# Patient Record
(demographics unavailable — no encounter records)

---

## 2024-10-08 NOTE — PHYSICAL EXAM
[Chaperone Present] : A chaperone was present in the examining room during all aspects of the physical examination [Normal] : No focal neurologic defects observed [Restricted in physically strenuous activity but ambulatory and able to carry out work of a light or sedentary nature] : Status 1- Restricted in physically strenuous activity but ambulatory and able to carry out work of a light or sedentary nature, e.g., light house work, office work [FreeTextEntry1] : wearing hajab.  has alopecia [de-identified] : incision c/d/i, SOft, NT

## 2024-10-08 NOTE — HISTORY OF PRESENT ILLNESS
[FreeTextEntry1] : Problem: 1) Primary Peritoneal Carcinoma  2) Germline testing negative, tumor HRD+   Previous Therapies: 1) Pelvic MRI 24     a) Uterus: 5.3 x 2.7 x 4.3 cm EMS 0.2cm     b) RIGHT OVARY: Normal in size and appearance measuring measures 2.4 x 1.3 x 1.8 cm.      c) LEFT OVARY: A normal-appearing left ovary is not visualized. Peritoneal adnexal masses as described below.     d) PERITONEUM: There are heterogeneous T2 hyperintense/T1 mildly hypointense pelvic masses at the left adnexa as well as anterior to the uterus and rectum. These measure as follows:          1) Mass extending anteriorly from the level of the uterine fundus just above the urinary bladder measuring approximately 6.4 x 4.8 x 6.5 cm. There is significant mural enhancing nodularity, particularly along the right side of the mass measuring up to 3.8 x 5.1 x 5.4 cm.          2)  second mass at the cul-de-sac posterior to the uterus measuring approximately 3.3 x 3.9 x 2.8 cm with small right paracentral area which is nonenhancing. Features are very similar to the first mass. The rectum partly wraps around this mass.         3) a left pelvic sidewall/adnexa similar route bilobed enhancing mass measuring approximately 4.8 x 2.2 x 5 cm.         4) left the lower pelvic sidewall nodule just medial to the sigmoid colon measuring approximately 2.7 x 2.1 x 1.8 cm.         5) anterior to the main mass is a small peritoneal implant just deep to the anterior abdominal wall measuring approximately 1.4 x 1.3 x 1.4 cm.      e) Moderate pelvic free fluid is visualized.     f)  a rectosigmoid wall thickening along the margins of the mass 2) CT A/P 24     a) Exophytic soft tissue lesion extending from the uterine fundus, likely a subserosal leiomyoma.     b) Left adnexal mass; likely originating from the left ovary.     c) Indeterminate 1.6 m soft tissue nodule adjacent to the cecum. As an isolated finding is nonspecific, however, cannot exclude a metastasis if the left ovarian mass is a malignant neoplasm.     d) Left hydronephrosis and hydroureter to the level of the patient's midpelvis, likely due to involvement by the above-described left adnexal mass. 3) S/P ex-lap JOSE F, BSO, omentectomy, removal of pelvic mass, flex sig by Dr. Arevalo, cysto with b/l temporary stents by urology 24     a) PELVIC WASH: Malignant Cells    b) . 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 Uterus, cervix, bilateral fallopian tubes and ovaries: Corpus: - Basal benign endometrium with cystic atrophy Cervix: - Tunnel clusters and Nabothian cysts Ovary, right: - No pathological diagnosis Fallopian tube, right: - No pathological diagnosis Ovary, left: - No pathological diagnosis Fallopian tube, left: -  Focal chronic inflammation involving the fimbriated end  3. 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 BRCA negative.   ObGYNHX: denies,  x 5 PMHX: Cardiac issues  - 2 stents placed , depression/anxiety  SX: cataract, cardiac stents  MED: Aspirin 81mg, atorvastatin, metoprolol, womens multivitamin ALL: NKDA SOCIAL: Never smoker  FAM: NC  Last Mammogram: up to date  Last pap/HPV: 23 neg Last Colonoscopy: never

## 2024-10-08 NOTE — PHYSICAL EXAM
[Chaperone Present] : A chaperone was present in the examining room during all aspects of the physical examination [Normal] : No focal neurologic defects observed [Restricted in physically strenuous activity but ambulatory and able to carry out work of a light or sedentary nature] : Status 1- Restricted in physically strenuous activity but ambulatory and able to carry out work of a light or sedentary nature, e.g., light house work, office work [FreeTextEntry1] : wearing hajab.  has alopecia [de-identified] : incision c/d/i, SOft, NT

## 2024-10-08 NOTE — DISCUSSION/SUMMARY
[Reviewed Clinical Lab Test(s)] : Results of clinical tests were reviewed. [FreeTextEntry1] : S/P ex-lap JOSE F, BSO, omentectomy, removal of pelvic mass, flex sig by Dr. Arevalo, cysto with b/l temporary stents by urology.  I discussed with the patient, reviewed the findings on history and physical examination, and reviewed the imaging studies in detail.   She will need NACT and then imaging after 3 cycles. Then she will have sigmoidoscopy with Pete Arevalo and eval for possible LAR +/- ostomy [] pre chemo labs done. [] chemo calendar given [] C2 Taxol/Carbo 10/11 []repeat imaging after C3, follow up prior to C3

## 2024-10-08 NOTE — PHYSICAL EXAM
[Chaperone Present] : A chaperone was present in the examining room during all aspects of the physical examination [Normal] : No focal neurologic defects observed [Restricted in physically strenuous activity but ambulatory and able to carry out work of a light or sedentary nature] : Status 1- Restricted in physically strenuous activity but ambulatory and able to carry out work of a light or sedentary nature, e.g., light house work, office work [FreeTextEntry1] : wearing hajab.  has alopecia [de-identified] : incision c/d/i, SOft, NT

## 2024-10-28 NOTE — HISTORY OF PRESENT ILLNESS
[FreeTextEntry1] : Problem: 1) Primary Peritoneal Carcinoma  2) Germline testing negative, tumor HRD+   Previous Therapies: 1) Pelvic MRI 24     a) Uterus: 5.3 x 2.7 x 4.3 cm EMS 0.2cm     b) RIGHT OVARY: Normal in size and appearance measuring measures 2.4 x 1.3 x 1.8 cm.      c) LEFT OVARY: A normal-appearing left ovary is not visualized. Peritoneal adnexal masses as described below.     d) PERITONEUM: There are heterogeneous T2 hyperintense/T1 mildly hypointense pelvic masses at the left adnexa as well as anterior to the uterus and rectum. These measure as follows:          1) Mass extending anteriorly from the level of the uterine fundus just above the urinary bladder measuring approximately 6.4 x 4.8 x 6.5 cm. There is significant mural enhancing nodularity, particularly along the right side of the mass measuring up to 3.8 x 5.1 x 5.4 cm.          2)  second mass at the cul-de-sac posterior to the uterus measuring approximately 3.3 x 3.9 x 2.8 cm with small right paracentral area which is nonenhancing. Features are very similar to the first mass. The rectum partly wraps around this mass.         3) a left pelvic sidewall/adnexa similar route bilobed enhancing mass measuring approximately 4.8 x 2.2 x 5 cm.         4) left the lower pelvic sidewall nodule just medial to the sigmoid colon measuring approximately 2.7 x 2.1 x 1.8 cm.         5) anterior to the main mass is a small peritoneal implant just deep to the anterior abdominal wall measuring approximately 1.4 x 1.3 x 1.4 cm.      e) Moderate pelvic free fluid is visualized.     f)  a rectosigmoid wall thickening along the margins of the mass 2) CT A/P 24     a) Exophytic soft tissue lesion extending from the uterine fundus, likely a subserosal leiomyoma.     b) Left adnexal mass; likely originating from the left ovary.     c) Indeterminate 1.6 m soft tissue nodule adjacent to the cecum. As an isolated finding is nonspecific, however, cannot exclude a metastasis if the left ovarian mass is a malignant neoplasm.     d) Left hydronephrosis and hydroureter to the level of the patient's midpelvis, likely due to involvement by the above-described left adnexal mass. 3) S/P ex-lap JOSE F, BSO, omentectomy, removal of pelvic mass, flex sig by Dr. Arevalo, cysto with b/l temporary stents by urology 24     a) PELVIC WASH: Malignant Cells    b) . 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 Uterus, cervix, bilateral fallopian tubes and ovaries: Corpus: - Basal benign endometrium with cystic atrophy Cervix: - Tunnel clusters and Nabothian cysts Ovary, right: - No pathological diagnosis Fallopian tube, right: - No pathological diagnosis Ovary, left: - No pathological diagnosis Fallopian tube, left: -  Focal chronic inflammation involving the fimbriated end  3. 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 Feeling well. Having persistent tingling of all fingers , bothersome but not painful. Toes have intermittent tingling. Bowel/bladder habits at baseline. Has come abdominopelvic cramping, comes and goes.   ObGYNHX: denies,  x 5 PMHX: Cardiac issues  - 2 stents placed , depression/anxiety  SX: cataract, cardiac stents  MED: Aspirin 81mg, atorvastatin, metoprolol, womens multivitamin ALL: NKDA SOCIAL: Never smoker  FAM: NC  Last Mammogram: up to date  Last pap/HPV: 23 neg Last Colonoscopy: never

## 2024-10-28 NOTE — DISCUSSION/SUMMARY
[FreeTextEntry1] : S/P ex-lap JOSE F, BSO, omentectomy, removal of pelvic mass, flex sig by Dr. Arevalo, cysto with b/l temporary stents by urology.  I discussed with the patient, reviewed the findings on history and physical examination, and reviewed the imaging studies in detail.   She will need NACT and then imaging after 3 cycles. Then she will have sigmoidoscopy with Dr. Arevalo and eval for possible LAR +/- ostomy [] pre chemo labs done. [] chemo calendar given [] C3 Taxol/Carbo 11/1 []repeat CTAP after this cycle, then follow up with Dr. Andrea to discuss results and possible LAR with Dr. Arevalo

## 2024-11-12 NOTE — DISCUSSION/SUMMARY
[Reviewed Clinical Lab Test(s)] : Results of clinical tests were reviewed. [FreeTextEntry1] : S/P ex-lap JOSE F, BSO, omentectomy, removal of pelvic mass, flex sig by Dr. Arevalo, cysto with b/l temporary stents by urology.  I discussed with the patient, reviewed the findings on history and physical examination, and reviewed the imaging studies in detail.   She will need NACT and then imaging after 3 cycles. Then she will have sigmoidoscopy with Dr. Arevalo and brandi for possible LAR +/- ostomy

## 2024-11-12 NOTE — PHYSICAL EXAM
[Chaperone Present] : A chaperone was present in the examining room during all aspects of the physical examination [Normal] : No focal neurologic defects observed [FreeTextEntry1] : wearing hajab.  has alopecia [de-identified] : incision c/d/i, SOft, NT [Restricted in physically strenuous activity but ambulatory and able to carry out work of a light or sedentary nature] : Status 1- Restricted in physically strenuous activity but ambulatory and able to carry out work of a light or sedentary nature, e.g., light house work, office work

## 2024-11-12 NOTE — HISTORY OF PRESENT ILLNESS
[FreeTextEntry1] : Problem: 1) Primary Peritoneal Carcinoma  2) Germline testing negative, tumor HRD+   Previous Therapies: 1) Pelvic MRI 24     a) Uterus: 5.3 x 2.7 x 4.3 cm EMS 0.2cm     b) RIGHT OVARY: Normal in size and appearance measuring measures 2.4 x 1.3 x 1.8 cm.      c) LEFT OVARY: A normal-appearing left ovary is not visualized. Peritoneal adnexal masses as described below.     d) PERITONEUM: There are heterogeneous T2 hyperintense/T1 mildly hypointense pelvic masses at the left adnexa as well as anterior to the uterus and rectum. These measure as follows:          1) Mass extending anteriorly from the level of the uterine fundus just above the urinary bladder measuring approximately 6.4 x 4.8 x 6.5 cm. There is significant mural enhancing nodularity, particularly along the right side of the mass measuring up to 3.8 x 5.1 x 5.4 cm.          2)  second mass at the cul-de-sac posterior to the uterus measuring approximately 3.3 x 3.9 x 2.8 cm with small right paracentral area which is nonenhancing. Features are very similar to the first mass. The rectum partly wraps around this mass.         3) a left pelvic sidewall/adnexa similar route bilobed enhancing mass measuring approximately 4.8 x 2.2 x 5 cm.         4) left the lower pelvic sidewall nodule just medial to the sigmoid colon measuring approximately 2.7 x 2.1 x 1.8 cm.         5) anterior to the main mass is a small peritoneal implant just deep to the anterior abdominal wall measuring approximately 1.4 x 1.3 x 1.4 cm.      e) Moderate pelvic free fluid is visualized.     f)  a rectosigmoid wall thickening along the margins of the mass 2) CT A/P 24     a) Exophytic soft tissue lesion extending from the uterine fundus, likely a subserosal leiomyoma.     b) Left adnexal mass; likely originating from the left ovary.     c) Indeterminate 1.6 m soft tissue nodule adjacent to the cecum. As an isolated finding is nonspecific, however, cannot exclude a metastasis if the left ovarian mass is a malignant neoplasm.     d) Left hydronephrosis and hydroureter to the level of the patient's midpelvis, likely due to involvement by the above-described left adnexal mass. 3) S/P ex-lap JOSE F, BSO, omentectomy, removal of pelvic mass, flex sig by Dr. Arevalo, cysto with b/l temporary stents by urology 24     a) PELVIC WASH: Malignant Cells    b) . 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 Uterus, cervix, bilateral fallopian tubes and ovaries: Corpus: - Basal benign endometrium with cystic atrophy Cervix: - Tunnel clusters and Nabothian cysts Ovary, right: - No pathological diagnosis Fallopian tube, right: - No pathological diagnosis Ovary, left: - No pathological diagnosis Fallopian tube, left: -  Focal chronic inflammation involving the fimbriated end  3. 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 Patient is s/p C3 T/C 24. For possible interval debulking of residual rectal disease with Dr. Arevalo.   ObGYNHX: denies,  x 5 PMHX: Cardiac issues  - 2 stents placed , depression/anxiety  SX: cataract, cardiac stents  MED: Aspirin 81mg, atorvastatin, metoprolol, womens multivitamin ALL: NKDA SOCIAL: Never smoker  FAM: NC  Last Mammogram: up to date  Last pap/HPV: 23 neg Last Colonoscopy: never

## 2024-11-23 NOTE — PHYSICAL EXAM
[Normal] : Recto-Vaginal Exam: Normal [FreeTextEntry1] : wearing hajab.  has alopecia [de-identified] : incision c/d/i, SOft, NT

## 2024-11-23 NOTE — PHYSICAL EXAM
[Normal] : Recto-Vaginal Exam: Normal [FreeTextEntry1] : wearing hajab.  has alopecia [de-identified] : incision c/d/i, SOft, NT

## 2024-11-23 NOTE — DISCUSSION/SUMMARY
[Reviewed Radiology Report(s)] : Radiology reports were reviewed. [Reviewed Radiology Film/Image(s)] : Images from radiology studies were reviewed and examined. [Discuss Tests w/Referring Providers] : Results of labs/radiology studies and the treatment recommendations were discussed with performing/referring physician. [Discuss Alternatives/Risks/Benefits w/Patient] : All alternatives, risks, and benefits were discussed with the patient/family and all questions were answered.  Patient expressed good understanding and appreciates the importance of follow up as recommended. [FreeTextEntry1] : S/P ex-lap JOSE F, BSO, omentectomy, removal of pelvic mass, flex sig by Dr. Arevalo, cysto with b/l temporary stents by urology.  I discussed with the patient, reviewed the findings on history and physical examination, and reviewed the imaging studies in detail.    She will have sigmoidoscopy with Dr. Arevalo and eval for possible LAR +/- ostomy  [] Appointment with Dr. Arevalo 11/25/24  [] Plan for advanced laparoscopic robot assisted LAR and HIPEC, possible open procedure, possible ostomy formation 12/03/24 [] Medical clearance with PCP and caridologist - patient is taking aspirin for cardiac stents [] Bowel prep [] Pre-op labs today [] Patient will likely require dose reduction when she resumes adjuvant chemotherapy due to neuropathy

## 2024-11-23 NOTE — PHYSICAL EXAM
[Normal] : Recto-Vaginal Exam: Normal [FreeTextEntry1] : wearing hajab.  has alopecia [de-identified] : incision c/d/i, SOft, NT

## 2024-11-23 NOTE — HISTORY OF PRESENT ILLNESS
[FreeTextEntry1] : Problem: 1) Primary Peritoneal Carcinoma  2) Germline testing negative, tumor HRD+   Previous Therapies: 1) Pelvic MRI 24     a) Uterus: 5.3 x 2.7 x 4.3 cm EMS 0.2cm     b) RIGHT OVARY: Normal in size and appearance measuring measures 2.4 x 1.3 x 1.8 cm.      c) LEFT OVARY: A normal-appearing left ovary is not visualized. Peritoneal adnexal masses as described below.     d) PERITONEUM: There are heterogeneous T2 hyperintense/T1 mildly hypointense pelvic masses at the left adnexa as well as anterior to the uterus and rectum. These measure as follows:          1) Mass extending anteriorly from the level of the uterine fundus just above the urinary bladder measuring approximately 6.4 x 4.8 x 6.5 cm. There is significant mural enhancing nodularity, particularly along the right side of the mass measuring up to 3.8 x 5.1 x 5.4 cm.          2)  second mass at the cul-de-sac posterior to the uterus measuring approximately 3.3 x 3.9 x 2.8 cm with small right paracentral area which is nonenhancing. Features are very similar to the first mass. The rectum partly wraps around this mass.         3) a left pelvic sidewall/adnexa similar route bilobed enhancing mass measuring approximately 4.8 x 2.2 x 5 cm.         4) left the lower pelvic sidewall nodule just medial to the sigmoid colon measuring approximately 2.7 x 2.1 x 1.8 cm.         5) anterior to the main mass is a small peritoneal implant just deep to the anterior abdominal wall measuring approximately 1.4 x 1.3 x 1.4 cm.      e) Moderate pelvic free fluid is visualized.     f)  a rectosigmoid wall thickening along the margins of the mass 2) CT A/P 24     a) Exophytic soft tissue lesion extending from the uterine fundus, likely a subserosal leiomyoma.     b) Left adnexal mass; likely originating from the left ovary.     c) Indeterminate 1.6 m soft tissue nodule adjacent to the cecum. As an isolated finding is nonspecific, however, cannot exclude a metastasis if the left ovarian mass is a malignant neoplasm.     d) Left hydronephrosis and hydroureter to the level of the patient's midpelvis, likely due to involvement by the above-described left adnexal mass. 3) S/P ex-lap JOSE F, BSO, omentectomy, removal of pelvic mass, flex sig by Dr. Arevalo, cysto with b/l temporary stents by urology 24     a) PELVIC WASH: Malignant Cells    b) . 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 Uterus, cervix, bilateral fallopian tubes and ovaries: Corpus: - Basal benign endometrium with cystic atrophy Cervix: - Tunnel clusters and Nabothian cysts Ovary, right: - No pathological diagnosis Fallopian tube, right: - No pathological diagnosis Ovary, left: - No pathological diagnosis Fallopian tube, left: -  Focal chronic inflammation involving the fimbriated end  3. 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 All decreased in size. No new sites of disease. * Soft tissue nodule adjacent to the cecum, measuring 1.3 cm (2:107), compared to 1.6 cm previously * left pelvic sidewall soft tissue lesion, measuring 1.5 cm (2:121), compared to 1.9 cm previously. * left pelvic sidewall lesion/enlarged lymph node, measuring approximately 1.9 x 2.4 cm (2:114), compared to 3.0 x 6.1 cm previously.  Here for follow up and to discuss CT results. Patient is s/p C3 T/C 24. For possible interval debulking of residual rectal disease with Dr. Arevalo. Overall feels well but has worsening neuropathy.  ObGYNHX: denies,  x 5 PMHX: Cardiac issues  - 2 stents placed , depression/anxiety  SX: cataract, cardiac stents  MED: Aspirin 81mg, atorvastatin, metoprolol, womens multivitamin ALL: NKDA SOCIAL: Never smoker  FAM: NC  Last Mammogram: up to date  Last pap/HPV: 23 neg Last Colonoscopy: never

## 2024-11-23 NOTE — PHYSICAL EXAM
[Normal] : Recto-Vaginal Exam: Normal [FreeTextEntry1] : wearing hajab.  has alopecia [de-identified] : incision c/d/i, SOft, NT

## 2024-11-25 NOTE — HISTORY OF PRESENT ILLNESS
[FreeTextEntry1] : 60 yo F presents for initial evaluation.  Referred by: Dr. Barajas Referral Reason: primary peritoneal carcinoma, plan for surgery  PMH: CAD s/p PCI 2stents, anxiety/ depression PSH: cataract, cardiac stents,  ex-lap JOSE F, BSO, omentectomy, removal of pelvic mass 8/22/24 FH: Denies CRC/IBD Medications: ASA, atorvastatin, metoprolol, multivitamin Allergies: NKDA Social history:  Last Colonoscopy: Never done.    CT A/P 8/21/24: IMPRESSION: 1.  Exophytic soft tissue lesion extending from the uterine fundus, likely a subserosal leiomyoma. 2.  Left adnexal mass; likely originating from the left ovary. 3.  Indeterminate 1.6 m soft tissue nodule adjacent to the cecum. As an isolated finding is nonspecific, however, cannot exclude a metastasis if the left ovarian mass is a malignant neoplasm. 4.  Left hydronephrosis and hydroureter to the level of the patient's midpelvis, likely due to involvement by the above-described left adnexal mass.   S/P ex-lap JOSE F, BSO, omentectomy, removal of pelvic mass by Dr. Barajas, flex sig by Dr. Arevalo, cysto with b/l temporary stents by urology 8/22/24 @[facility] Pathology: 3. 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 IHC shows the tumor cells to be positive for PAX-8, WT-1, ER, p16, AE1/AE3, CAM5.2 and CK7 and show aberrant p53 overexpression (mutated phenotype); and are negative for CK 20, GATA3, p40, p63, chromogranic, synaptophysin, Napsin, and S-100 protein, findings supportive of high serous adenocarcinoma of Mullerian origin. The entire ovaries and fallopian tubes have been examined microscopically, and no carcinoma was identified in the adnexa (bilateral) nor the endometrium, suggesting the this adenocarcinoma is of primary peritoneal origin.  Also, the tumor designated as "rectal lumen tumor" shows the same morphological features as the peritoneal mass, and no bowel wall tissue is seen in the part 3  of the specimen, findings suggestive of secondary involvement of bowel.  Started on Taxol/Carbo 9/20/24, most recent treatment 11/01/2024 Pertinent labs: 9/30/24: [facility] HRD (+), GIS (+), BRCA (-)  10/8/24: CA-125: 224  10/29/24: CA-125: 115   CT A/P 11/2024: BOWEL: No bowel obstruction. PERITONEUM/RETROPERITONEUM: Decreased size of previously noted soft tissue nodule adjacent to the cecum, measuring 1.3 cm (2:107), compared to 1.6 cm previously. Decreased size of a left pelvic sidewall soft tissue lesion, measuring 1.5 cm (2:121), compared to 1.9 cm previously. VESSELS: Within normal limits. LYMPH NODES: Decreased size of previously noted left pelvic sidewall lesion/enlarged lymph node, measuring approximately 1.9 x 2.4 cm (2:114), compared to 3.0 x 6.1 cm previously. ABDOMINAL WALL: Within normal limits. BONES: Within normal limits.  IMPRESSION: 3 sites of peritoneal disease/lymphadenopathy noted. All decreased in size. No new sites of disease.   Last Seen by Dr. Alonso on 11/20/24, Plan for advanced laparoscopic robot assisted LAR and HIPEC, possible open procedure, possible ostomy formation, scheduled for 12/12/24  Patient presents today for surgical discussion.  Patient follows up with Cardio Nj Pacheco, patient last saw him 2 weeks ago for EKG and is scheduled to follow up 12/02/24 for clearance.  Denies any abdominal complaints, denies any rectal bleeding, rectal pain or any change in stools.  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

## 2024-11-25 NOTE — HISTORY OF PRESENT ILLNESS
[FreeTextEntry1] : 58 yo F presents for initial evaluation.  Referred by: Dr. Barajas Referral Reason: primary peritoneal carcinoma, plan for surgery  PMH: CAD s/p PCI 2stents, anxiety/ depression PSH: cataract, cardiac stents,  ex-lap JOSE F, BSO, omentectomy, removal of pelvic mass 8/22/24 FH: Denies CRC/IBD Medications: ASA, atorvastatin, metoprolol, multivitamin Allergies: NKDA Social history:  Last Colonoscopy: Never done.    CT A/P 8/21/24: IMPRESSION: 1.  Exophytic soft tissue lesion extending from the uterine fundus, likely a subserosal leiomyoma. 2.  Left adnexal mass; likely originating from the left ovary. 3.  Indeterminate 1.6 m soft tissue nodule adjacent to the cecum. As an isolated finding is nonspecific, however, cannot exclude a metastasis if the left ovarian mass is a malignant neoplasm. 4.  Left hydronephrosis and hydroureter to the level of the patient's midpelvis, likely due to involvement by the above-described left adnexal mass.   S/P ex-lap JOSE F, BSO, omentectomy, removal of pelvic mass by Dr. Barajas, flex sig by Dr. Arevalo, cysto with b/l temporary stents by urology 8/22/24 @[facility] Pathology: 3. 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 IHC shows the tumor cells to be positive for PAX-8, WT-1, ER, p16, AE1/AE3, CAM5.2 and CK7 and show aberrant p53 overexpression (mutated phenotype); and are negative for CK 20, GATA3, p40, p63, chromogranic, synaptophysin, Napsin, and S-100 protein, findings supportive of high serous adenocarcinoma of Mullerian origin. The entire ovaries and fallopian tubes have been examined microscopically, and no carcinoma was identified in the adnexa (bilateral) nor the endometrium, suggesting the this adenocarcinoma is of primary peritoneal origin.  Also, the tumor designated as "rectal lumen tumor" shows the same morphological features as the peritoneal mass, and no bowel wall tissue is seen in the part 3  of the specimen, findings suggestive of secondary involvement of bowel.  Started on Taxol/Carbo 9/20/24, most recent treatment 11/01/2024 Pertinent labs: 9/30/24: Myriad HRD (+), GIS (+), BRCA (-)  10/8/24: CA-125: 224  10/29/24: CA-125: 115   CT A/P 11/2024: BOWEL: No bowel obstruction. PERITONEUM/RETROPERITONEUM: Decreased size of previously noted soft tissue nodule adjacent to the cecum, measuring 1.3 cm (2:107), compared to 1.6 cm previously. Decreased size of a left pelvic sidewall soft tissue lesion, measuring 1.5 cm (2:121), compared to 1.9 cm previously. VESSELS: Within normal limits. LYMPH NODES: Decreased size of previously noted left pelvic sidewall lesion/enlarged lymph node, measuring approximately 1.9 x 2.4 cm (2:114), compared to 3.0 x 6.1 cm previously. ABDOMINAL WALL: Within normal limits. BONES: Within normal limits.  IMPRESSION: 3 sites of peritoneal disease/lymphadenopathy noted. All decreased in size. No new sites of disease.   Last Seen by Dr. Alonso on 11/20/24, Plan for advanced laparoscopic robot assisted LAR and HIPEC, possible open procedure, possible ostomy formation, scheduled for 12/12/24  Patient presents today for surgical discussion.  Patient follows up with Cardio Nj Pacheco, patient last saw him 2 weeks ago for EKG and is scheduled to follow up 12/02/24 for clearance.  Denies any abdominal complaints, denies any rectal bleeding, rectal pain or any change in stools.  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

## 2024-11-25 NOTE — ASSESSMENT
[FreeTextEntry1] : I had extensive discussion with the patient and her  regarding her findings and presentation of her locally advanced primary peritoneal adenocarcinoma.  I have discussed with them that given the involvement of the rectum and secondary locations including cecum she is a candidate for a debulking procedure to be performed with the gynecology oncology service.  This will require a partial cecal resection as well as low anterior resection with extension of the pelvic sidewall excision.  She is scheduled to undergo planned hyperthermic chemo.  Given the extent of the surgery I have recommended temporary fecal diversion-diverting loop ileostomy creation.  I have outlined to them the risks, benefits alternatives of surgery include not limited risk of bleeding, infection, hernia, obstruction, permanent alteration in bowel function and potential need for future procedures.  All questions answered.  Appropriate literature was given for the review.  Recommend enterostomal consultation prior to surgery.

## 2024-12-19 NOTE — REASON FOR VISIT
[Home] : at home, [unfilled] , at the time of the visit. [Medical Office: ([facility])___] : at the medical office located in  [Patient] : the patient [Self] : self [FreeTextEntry1] : 1 week post-op

## 2024-12-19 NOTE — HISTORY OF PRESENT ILLNESS
[FreeTextEntry1] : Problem: 1) Primary Peritoneal Carcinoma  2) Germline testing negative, tumor HRD+   Previous Therapies: 1) Pelvic MRI 24     a) Uterus: 5.3 x 2.7 x 4.3 cm EMS 0.2cm     b) RIGHT OVARY: Normal in size and appearance measuring measures 2.4 x 1.3 x 1.8 cm.      c) LEFT OVARY: A normal-appearing left ovary is not visualized. Peritoneal adnexal masses as described below.     d) PERITONEUM: There are heterogeneous T2 hyperintense/T1 mildly hypointense pelvic masses at the left adnexa as well as anterior to the uterus and rectum. These measure as follows:          1) Mass extending anteriorly from the level of the uterine fundus just above the urinary bladder measuring approximately 6.4 x 4.8 x 6.5 cm. There is significant mural enhancing nodularity, particularly along the right side of the mass measuring up to 3.8 x 5.1 x 5.4 cm.          2)  second mass at the cul-de-sac posterior to the uterus measuring approximately 3.3 x 3.9 x 2.8 cm with small right paracentral area which is nonenhancing. Features are very similar to the first mass. The rectum partly wraps around this mass.         3) a left pelvic sidewall/adnexa similar route bilobed enhancing mass measuring approximately 4.8 x 2.2 x 5 cm.         4) left the lower pelvic sidewall nodule just medial to the sigmoid colon measuring approximately 2.7 x 2.1 x 1.8 cm.         5) anterior to the main mass is a small peritoneal implant just deep to the anterior abdominal wall measuring approximately 1.4 x 1.3 x 1.4 cm.      e) Moderate pelvic free fluid is visualized.     f)  a rectosigmoid wall thickening along the margins of the mass 2) CT A/P 24     a) Exophytic soft tissue lesion extending from the uterine fundus, likely a subserosal leiomyoma.     b) Left adnexal mass; likely originating from the left ovary.     c) Indeterminate 1.6 m soft tissue nodule adjacent to the cecum. As an isolated finding is nonspecific, however, cannot exclude a metastasis if the left ovarian mass is a malignant neoplasm.     d) Left hydronephrosis and hydroureter to the level of the patient's midpelvis, likely due to involvement by the above-described left adnexal mass. 3) S/P ex-lap JOSE F, BSO, omentectomy, removal of pelvic mass, flex sig by Dr. Arevalo, cysto with b/l temporary stents by urology 24     a) PELVIC WASH: Malignant Cells    b) . 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 Uterus, cervix, bilateral fallopian tubes and ovaries: Corpus: - Basal benign endometrium with cystic atrophy Cervix: - Tunnel clusters and Nabothian cysts Ovary, right: - No pathological diagnosis Fallopian tube, right: - No pathological diagnosis Ovary, left: - No pathological diagnosis Fallopian tube, left: -  Focal chronic inflammation involving the fimbriated end  3. 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 All decreased in size. No new sites of disease. * Soft tissue nodule adjacent to the cecum, measuring 1.3 cm (2:107), compared to 1.6 cm previously * left pelvic sidewall soft tissue lesion, measuring 1.5 cm (2:121), compared to 1.9 cm previously. * left pelvic sidewall lesion/enlarged lymph node, measuring approximately 1.9 x 2.4 cm (2:114), compared to 3.0 x 6.1 cm previously. 6) S/P  Diagnostic laparoscopy, open exploratory laparotomy, lysis of adhesions and assistant to low anterior resection, HIPEC 24     a)   60yo s/p 3 cycles of adjuvant T/C now s/p Dx lpsc, exlap, lysis of adhesions and assistant to low anterior resection HIPEC here for 1 week post-op visit. Pt doing ok. Trying not to take much pain medication. + bms. Continuing with low fiber diet as per Colorectal.   ObGYNHX: denies,  x 5 PMHX: Cardiac issues  - 2 stents placed , depression/anxiety  SX: cataract, cardiac stents  MED: Aspirin 81mg, atorvastatin, metoprolol, womens multivitamin ALL: NKDA SOCIAL: Never smoker  FAM: NC  Last Mammogram: up to date  Last pap/HPV: 23 neg Last Colonoscopy: never

## 2024-12-19 NOTE — DISCUSSION/SUMMARY
[FreeTextEntry1] : s/p Dx lpsc, exlap, lysis of adhesions and assistant to low anterior resection, HIPEC Meeting post-op milestones  [] full set labs [] post-op precautions given [] f/u pathology [] f/u with Dr. Barajas in 1/13/24 [] f/u with Dr. Arevalo 1/3/24

## 2025-01-06 NOTE — HISTORY OF PRESENT ILLNESS
[FreeTextEntry1] : 58 yo F presents for follow up.   Referred by: Dr. Barajas Referral Reason: primary peritoneal carcinoma, plan for surgery  PMH: CAD s/p PCI 2stents, anxiety/ depression PSH: cataract, cardiac stents, ex-lap JOSE F, BSO, omentectomy, removal of pelvic mass 8/22/24 FH: Denies CRC/IBD Medications: ASA, atorvastatin, metoprolol, multivitamin Allergies: NKDA Social history:  Last Colonoscopy: Never done.  CT A/P 8/21/24: IMPRESSION: 1. Exophytic soft tissue lesion extending from the uterine fundus, likely a subserosal leiomyoma. 2. Left adnexal mass; likely originating from the left ovary. 3. Indeterminate 1.6 m soft tissue nodule adjacent to the cecum. As an isolated finding is nonspecific, however, cannot exclude a metastasis if the left ovarian mass is a malignant neoplasm. 4. Left hydronephrosis and hydroureter to the level of the patient's midpelvis, likely due to involvement by the above-described left adnexal mass.   S/P ex-lap JOSE F, BSO, omentectomy, removal of pelvic mass by Dr. Barajas, flex sig by Dr. Arevalo, cysto with b/l temporary stents by urology 8/22/24 @[facility] Pathology: 3. 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 IHC shows the tumor cells to be positive for PAX-8, WT-1, ER, p16, AE1/AE3, CAM5.2 and CK7 and show aberrant p53 overexpression (mutated phenotype); and are negative for CK 20, GATA3, p40, p63, chromogranic, synaptophysin, Napsin, and S-100 protein, findings supportive of high serous adenocarcinoma of Mullerian origin. The entire ovaries and fallopian tubes have been examined microscopically, and no carcinoma was identified in the adnexa (bilateral) nor the endometrium, suggesting the this adenocarcinoma is of primary peritoneal origin.  Also, the tumor designated as "rectal lumen tumor" shows the same morphological features as the peritoneal mass, and no bowel wall tissue is seen in the part 3 of the specimen, findings suggestive of secondary involvement of bowel.  Started on Taxol/Carbo 9/20/24, most recent treatment 11/01/2024 Pertinent labs: 9/30/24: Myriad HRD (+), GIS (+), BRCA (-)  10/8/24: CA-125: 224  10/29/24: CA-125: 115  CT A/P 11/2024: BOWEL: No bowel obstruction. PERITONEUM/RETROPERITONEUM: Decreased size of previously noted soft tissue nodule adjacent to the cecum, measuring 1.3 cm (2:107), compared to 1.6 cm previously. Decreased size of a left pelvic sidewall soft tissue lesion, measuring 1.5 cm (2:121), compared to 1.9 cm previously. VESSELS: Within normal limits. LYMPH NODES: Decreased size of previously noted left pelvic sidewall lesion/enlarged lymph node, measuring approximately 1.9 x 2.4 cm (2:114), compared to 3.0 x 6.1 cm previously. ABDOMINAL WALL: Within normal limits. BONES: Within normal limits.  IMPRESSION: 3 sites of peritoneal disease/lymphadenopathy noted. All decreased in size. No new sites of disease.  Last Seen by Dr. Alonso on 11/20/24, Plan for advanced laparoscopic robot assisted LAR and HIPEC, possible open procedure, possible ostomy formation, scheduled for 12/12/24  Initially seen 11/25/24  Patient follows up with Cardio Nj Pacheco, patient last saw him 2 weeks ago for EKG and is scheduled to follow up 12/02/24 for clearance. Denies any abdominal complaints, denies any rectal bleeding, rectal pain or any change in stools. 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 Recommend robotic sigmoid resection with HIPEC  12/12/24 s/p diagnostic laparoscopy, open exploratory laparotomy, lysis of adhesions and assistant to low anterior resection.  Pathology: 1. Pararectal nodule: -   Metastatic high-grade serous carcinoma involving fibroadipose tissue (see note) Note: The tumor is positive for CK7, PAX8 and WT1, and negative for CK20, and shows aberrant (overexpression) p53 expression, supporting diagnosis. 2. Appendix: -   Benign appendix -   One benign lymph node 3. Rectosigmoid: -   Metastatic high-grade serous carcinoma involving the pericolic soft tissue, muscularis propria and submucosa -   Colonic mucosa is unremarkable -   Resection margins are negative -   Twenty-two benign lymph nodes 4. Portion of omentum: -   Benign fibroadipose tissue with focal inflammation and fat necrosis 5. Proximal donut: -   Benign portion of colon 6. Distal donut: -   Benign portion of colon  In interim, patient had complaints of frequent rectal pressure with desire to defecate. Moving multiple times a day. Stool softeners prescribed to patient.   Returns today in follow-up.  Rectal pressure improving.  Bowel movements irregular.  Good energy and appetite.

## 2025-01-06 NOTE — PHYSICAL EXAM
[Abdomen Masses] : No abdominal masses [Abdomen Tenderness] : ~T No ~M abdominal tenderness [No HSM] : no hepatosplenomegaly [JVD] : no jugular venous distention  [Normal Breath Sounds] : Normal breath sounds [Normal Heart Sounds] : normal heart sounds [Alert] : alert [Calm] : calm [de-identified] : ,i

## 2025-01-06 NOTE — PHYSICAL EXAM
[Abdomen Masses] : No abdominal masses [Abdomen Tenderness] : ~T No ~M abdominal tenderness [No HSM] : no hepatosplenomegaly [JVD] : no jugular venous distention  [Normal Breath Sounds] : Normal breath sounds [Normal Heart Sounds] : normal heart sounds [Alert] : alert [Calm] : calm [de-identified] : ,i

## 2025-01-06 NOTE — HISTORY OF PRESENT ILLNESS
[FreeTextEntry1] : 60 yo F presents for follow up.   Referred by: Dr. Barajas Referral Reason: primary peritoneal carcinoma, plan for surgery  PMH: CAD s/p PCI 2stents, anxiety/ depression PSH: cataract, cardiac stents, ex-lap JOSE F, BSO, omentectomy, removal of pelvic mass 8/22/24 FH: Denies CRC/IBD Medications: ASA, atorvastatin, metoprolol, multivitamin Allergies: NKDA Social history:  Last Colonoscopy: Never done.  CT A/P 8/21/24: IMPRESSION: 1. Exophytic soft tissue lesion extending from the uterine fundus, likely a subserosal leiomyoma. 2. Left adnexal mass; likely originating from the left ovary. 3. Indeterminate 1.6 m soft tissue nodule adjacent to the cecum. As an isolated finding is nonspecific, however, cannot exclude a metastasis if the left ovarian mass is a malignant neoplasm. 4. Left hydronephrosis and hydroureter to the level of the patient's midpelvis, likely due to involvement by the above-described left adnexal mass.   S/P ex-lap JOSE F, BSO, omentectomy, removal of pelvic mass by Dr. Barajas, flex sig by Dr. Arevalo, cysto with b/l temporary stents by urology 8/22/24 @[facility] Pathology: 3. 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 IHC shows the tumor cells to be positive for PAX-8, WT-1, ER, p16, AE1/AE3, CAM5.2 and CK7 and show aberrant p53 overexpression (mutated phenotype); and are negative for CK 20, GATA3, p40, p63, chromogranic, synaptophysin, Napsin, and S-100 protein, findings supportive of high serous adenocarcinoma of Mullerian origin. The entire ovaries and fallopian tubes have been examined microscopically, and no carcinoma was identified in the adnexa (bilateral) nor the endometrium, suggesting the this adenocarcinoma is of primary peritoneal origin.  Also, the tumor designated as "rectal lumen tumor" shows the same morphological features as the peritoneal mass, and no bowel wall tissue is seen in the part 3 of the specimen, findings suggestive of secondary involvement of bowel.  Started on Taxol/Carbo 9/20/24, most recent treatment 11/01/2024 Pertinent labs: 9/30/24: Myriad HRD (+), GIS (+), BRCA (-)  10/8/24: CA-125: 224  10/29/24: CA-125: 115  CT A/P 11/2024: BOWEL: No bowel obstruction. PERITONEUM/RETROPERITONEUM: Decreased size of previously noted soft tissue nodule adjacent to the cecum, measuring 1.3 cm (2:107), compared to 1.6 cm previously. Decreased size of a left pelvic sidewall soft tissue lesion, measuring 1.5 cm (2:121), compared to 1.9 cm previously. VESSELS: Within normal limits. LYMPH NODES: Decreased size of previously noted left pelvic sidewall lesion/enlarged lymph node, measuring approximately 1.9 x 2.4 cm (2:114), compared to 3.0 x 6.1 cm previously. ABDOMINAL WALL: Within normal limits. BONES: Within normal limits.  IMPRESSION: 3 sites of peritoneal disease/lymphadenopathy noted. All decreased in size. No new sites of disease.  Last Seen by Dr. Alonso on 11/20/24, Plan for advanced laparoscopic robot assisted LAR and HIPEC, possible open procedure, possible ostomy formation, scheduled for 12/12/24  Initially seen 11/25/24  Patient follows up with Cardio Nj Pacheco, patient last saw him 2 weeks ago for EKG and is scheduled to follow up 12/02/24 for clearance. Denies any abdominal complaints, denies any rectal bleeding, rectal pain or any change in stools. 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 Recommend robotic sigmoid resection with HIPEC  12/12/24 s/p diagnostic laparoscopy, open exploratory laparotomy, lysis of adhesions and assistant to low anterior resection.  Pathology: 1. Pararectal nodule: -   Metastatic high-grade serous carcinoma involving fibroadipose tissue (see note) Note: The tumor is positive for CK7, PAX8 and WT1, and negative for CK20, and shows aberrant (overexpression) p53 expression, supporting diagnosis. 2. Appendix: -   Benign appendix -   One benign lymph node 3. Rectosigmoid: -   Metastatic high-grade serous carcinoma involving the pericolic soft tissue, muscularis propria and submucosa -   Colonic mucosa is unremarkable -   Resection margins are negative -   Twenty-two benign lymph nodes 4. Portion of omentum: -   Benign fibroadipose tissue with focal inflammation and fat necrosis 5. Proximal donut: -   Benign portion of colon 6. Distal donut: -   Benign portion of colon  In interim, patient had complaints of frequent rectal pressure with desire to defecate. Moving multiple times a day. Stool softeners prescribed to patient.   Returns today in follow-up.  Rectal pressure improving.  Bowel movements irregular.  Good energy and appetite.

## 2025-01-06 NOTE — ASSESSMENT
[FreeTextEntry1] : Recommend slow introduction of fiber to improve bowel habits.  Liberalize diet and activity.  Follow-up 6 months.  Recommend return to primary GYN oncology for continued management of primary peritoneal base carcinoma.  All questions answered

## 2025-01-06 NOTE — PHYSICAL EXAM
[Abdomen Masses] : No abdominal masses [Abdomen Tenderness] : ~T No ~M abdominal tenderness [No HSM] : no hepatosplenomegaly [JVD] : no jugular venous distention  [Normal Breath Sounds] : Normal breath sounds [Normal Heart Sounds] : normal heart sounds [Alert] : alert [Calm] : calm [de-identified] : ,i

## 2025-01-13 NOTE — PHYSICAL EXAM
[Chaperone Present] : A chaperone was present in the examining room during all aspects of the physical examination [[id number]] : A chaperone was present during the pelvic exam. [Absent] : Adnexa(ae): Absent [Normal] : Bimanual Exam: Normal [de-identified] : incision c/d/i [Fully active, able to carry on all pre-disease performance without restriction] : Status 0 - Fully active, able to carry on all pre-disease performance without restriction

## 2025-01-13 NOTE — DISCUSSION/SUMMARY
[Reviewed Clinical Lab Test(s)] : Results of clinical tests were reviewed. [Discuss Alternatives/Risks/Benefits w/Patient] : All alternatives, risks, and benefits were discussed with the patient/family and all questions were answered.  Patient expressed good understanding and appreciates the importance of follow up as recommended. [Diagnosis/Stage ___] : Given this data, a diagnosis of [unfilled] is rendered. [FreeTextEntry1] : s/p Dx lpsc, exlap, lysis of adhesions and assistant to low anterior resection, HIPEC Meeting post-op milestones Has mild neuropathy in right thigh. Discussed dose reduction vs switch to docetaxel.  [] C4 carbo/taxol with dose reduction to 135mg for neuropathy [] Prechemo labs today [] Return to clinic in 3 weeks

## 2025-01-13 NOTE — HISTORY OF PRESENT ILLNESS
[FreeTextEntry1] : Problem: 1) Primary Peritoneal Carcinoma  2) Germline testing negative, tumor HRD+   Previous Therapies: 1) Pelvic MRI 24     a) Uterus: 5.3 x 2.7 x 4.3 cm EMS 0.2cm     b) RIGHT OVARY: Normal in size and appearance measuring measures 2.4 x 1.3 x 1.8 cm.      c) LEFT OVARY: A normal-appearing left ovary is not visualized. Peritoneal adnexal masses as described below.     d) PERITONEUM: There are heterogeneous T2 hyperintense/T1 mildly hypointense pelvic masses at the left adnexa as well as anterior to the uterus and rectum. These measure as follows:          1) Mass extending anteriorly from the level of the uterine fundus just above the urinary bladder measuring approximately 6.4 x 4.8 x 6.5 cm. There is significant mural enhancing nodularity, particularly along the right side of the mass measuring up to 3.8 x 5.1 x 5.4 cm.          2)  second mass at the cul-de-sac posterior to the uterus measuring approximately 3.3 x 3.9 x 2.8 cm with small right paracentral area which is nonenhancing. Features are very similar to the first mass. The rectum partly wraps around this mass.         3) a left pelvic sidewall/adnexa similar route bilobed enhancing mass measuring approximately 4.8 x 2.2 x 5 cm.         4) left the lower pelvic sidewall nodule just medial to the sigmoid colon measuring approximately 2.7 x 2.1 x 1.8 cm.         5) anterior to the main mass is a small peritoneal implant just deep to the anterior abdominal wall measuring approximately 1.4 x 1.3 x 1.4 cm.      e) Moderate pelvic free fluid is visualized.     f)  a rectosigmoid wall thickening along the margins of the mass 2) CT A/P 24     a) Exophytic soft tissue lesion extending from the uterine fundus, likely a subserosal leiomyoma.     b) Left adnexal mass; likely originating from the left ovary.     c) Indeterminate 1.6 m soft tissue nodule adjacent to the cecum. As an isolated finding is nonspecific, however, cannot exclude a metastasis if the left ovarian mass is a malignant neoplasm.     d) Left hydronephrosis and hydroureter to the level of the patient's midpelvis, likely due to involvement by the above-described left adnexal mass. 3) S/P ex-lap JOSE F, BSO, omentectomy, removal of pelvic mass, flex sig by Dr. Arevalo, cysto with b/l temporary stents by urology 24     a) PELVIC WASH: Malignant Cells    b) . 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 Uterus, cervix, bilateral fallopian tubes and ovaries: Corpus: - Basal benign endometrium with cystic atrophy Cervix: - Tunnel clusters and Nabothian cysts Ovary, right: - No pathological diagnosis Fallopian tube, right: - No pathological diagnosis Ovary, left: - No pathological diagnosis Fallopian tube, left: -  Focal chronic inflammation involving the fimbriated end  3. 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 All decreased in size. No new sites of disease. * Soft tissue nodule adjacent to the cecum, measuring 1.3 cm (2:107), compared to 1.6 cm previously * left pelvic sidewall soft tissue lesion, measuring 1.5 cm (2:121), compared to 1.9 cm previously. * left pelvic sidewall lesion/enlarged lymph node, measuring approximately 1.9 x 2.4 cm (2:114), compared to 3.0 x 6.1 cm previously. 6) S/P  Diagnostic laparoscopy, open exploratory laparotomy, lysis of adhesions and assistant to low anterior resection, HIPEC 24    1. 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  2. Appendix: -   Benign appendix -   One benign lymph node  3. Rectosigmoid: -   Metastatic high-grade serous carcinoma involving the pericolic soft tissue, muscularis propria and submucosa -   Colonic mucosa is unremarkable -   Resection margins are negative -   Twenty-two benign lymph nodes  4. Portion of omentum: -   Benign fibroadipose tissue with focal inflammation and fat necrosis  5. Proximal donut: -   Benign portion of colon  6. Distal donut: -   Benign portion of colon  Here for 1 month post op visit after interval debulking, LAR and HIPEC. Patient feeling well. Has neuropathy in right thigh.  ObGYNHX: denies,  x 5 PMHX: Cardiac issues  - 2 stents placed , depression/anxiety  SX: cataract, cardiac stents  MED: Aspirin 81mg, atorvastatin, metoprolol, womens multivitamin ALL: NKDA SOCIAL: Never smoker  FAM: NC  Last Mammogram: up to date  Last pap/HPV: 23 neg Last Colonoscopy: never

## 2025-01-13 NOTE — PHYSICAL EXAM
[Chaperone Present] : A chaperone was present in the examining room during all aspects of the physical examination [99447] : A chaperone was present during the pelvic exam. [Absent] : Adnexa(ae): Absent [Normal] : Bimanual Exam: Normal [de-identified] : incision c/d/i [Fully active, able to carry on all pre-disease performance without restriction] : Status 0 - Fully active, able to carry on all pre-disease performance without restriction

## 2025-02-12 NOTE — HISTORY OF PRESENT ILLNESS
[FreeTextEntry1] : Problem: 1) Primary Peritoneal Carcinoma  2) Germline testing negative, tumor HRD+   Previous Therapies: 1) Pelvic MRI 24     a) Uterus: 5.3 x 2.7 x 4.3 cm EMS 0.2cm     b) RIGHT OVARY: Normal in size and appearance measuring measures 2.4 x 1.3 x 1.8 cm.      c) LEFT OVARY: A normal-appearing left ovary is not visualized. Peritoneal adnexal masses as described below.     d) PERITONEUM: There are heterogeneous T2 hyperintense/T1 mildly hypointense pelvic masses at the left adnexa as well as anterior to the uterus and rectum. These measure as follows:          1) Mass extending anteriorly from the level of the uterine fundus just above the urinary bladder measuring approximately 6.4 x 4.8 x 6.5 cm. There is significant mural enhancing nodularity, particularly along the right side of the mass measuring up to 3.8 x 5.1 x 5.4 cm.          2)  second mass at the cul-de-sac posterior to the uterus measuring approximately 3.3 x 3.9 x 2.8 cm with small right paracentral area which is nonenhancing. Features are very similar to the first mass. The rectum partly wraps around this mass.         3) a left pelvic sidewall/adnexa similar route bilobed enhancing mass measuring approximately 4.8 x 2.2 x 5 cm.         4) left the lower pelvic sidewall nodule just medial to the sigmoid colon measuring approximately 2.7 x 2.1 x 1.8 cm.         5) anterior to the main mass is a small peritoneal implant just deep to the anterior abdominal wall measuring approximately 1.4 x 1.3 x 1.4 cm.      e) Moderate pelvic free fluid is visualized.     f)  a rectosigmoid wall thickening along the margins of the mass 2) CT A/P 24     a) Exophytic soft tissue lesion extending from the uterine fundus, likely a subserosal leiomyoma.     b) Left adnexal mass; likely originating from the left ovary.     c) Indeterminate 1.6 m soft tissue nodule adjacent to the cecum. As an isolated finding is nonspecific, however, cannot exclude a metastasis if the left ovarian mass is a malignant neoplasm.     d) Left hydronephrosis and hydroureter to the level of the patient's midpelvis, likely due to involvement by the above-described left adnexal mass. 3) S/P ex-lap JOSE F, BSO, omentectomy, removal of pelvic mass, flex sig by Dr. Arevalo, cysto with b/l temporary stents by urology 24     a) PELVIC WASH: Malignant Cells    b) . 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 Uterus, cervix, bilateral fallopian tubes and ovaries: Corpus: - Basal benign endometrium with cystic atrophy Cervix: - Tunnel clusters and Nabothian cysts Ovary, right: - No pathological diagnosis Fallopian tube, right: - No pathological diagnosis Ovary, left: - No pathological diagnosis Fallopian tube, left: -  Focal chronic inflammation involving the fimbriated end  3. 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 All decreased in size. No new sites of disease. * Soft tissue nodule adjacent to the cecum, measuring 1.3 cm (2:107), compared to 1.6 cm previously * left pelvic sidewall soft tissue lesion, measuring 1.5 cm (2:121), compared to 1.9 cm previously. * left pelvic sidewall lesion/enlarged lymph node, measuring approximately 1.9 x 2.4 cm (2:114), compared to 3.0 x 6.1 cm previously. 6) S/P  Diagnostic laparoscopy, open exploratory laparotomy, lysis of adhesions and assistant to low anterior resection, HIPEC 24    1. 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  2. Appendix: -   Benign appendix -   One benign lymph node  3. Rectosigmoid: -   Metastatic high-grade serous carcinoma involving the pericolic soft tissue, muscularis propria and submucosa -   Colonic mucosa is unremarkable -   Resection margins are negative -   Twenty-two benign lymph nodes  4. Portion of omentum: -   Benign fibroadipose tissue with focal inflammation and fat necrosis  5. Proximal donut: -   Benign portion of colon  6. Distal donut: -   Benign portion of colon  6) CHEMO SUMMARY- Adjuvant     a) C4 T/C 25     b) C5 T/C 25  60yo here for prechemo appointment prior to C5 (C2 adjuvant). Patient doing well. Has minimal neuropathy in hands and feet, worse in right leg, able to complete activities of daily living. Overall neuropathy is unchanged.  ObGYNHX: denies,  x 5 PMHX: Cardiac issues  - 2 stents placed , depression/anxiety  SX: cataract, cardiac stents  MED: Aspirin 81mg, atorvastatin, metoprolol, womens multivitamin ALL: NKDA SOCIAL: Never smoker  FAM: NC  Last Mammogram: up to date  Last pap/HPV: 23 neg Last Colonoscopy: never

## 2025-02-12 NOTE — HISTORY OF PRESENT ILLNESS
[FreeTextEntry1] : Problem: 1) Primary Peritoneal Carcinoma  2) Germline testing negative, tumor HRD+   Previous Therapies: 1) Pelvic MRI 24     a) Uterus: 5.3 x 2.7 x 4.3 cm EMS 0.2cm     b) RIGHT OVARY: Normal in size and appearance measuring measures 2.4 x 1.3 x 1.8 cm.      c) LEFT OVARY: A normal-appearing left ovary is not visualized. Peritoneal adnexal masses as described below.     d) PERITONEUM: There are heterogeneous T2 hyperintense/T1 mildly hypointense pelvic masses at the left adnexa as well as anterior to the uterus and rectum. These measure as follows:          1) Mass extending anteriorly from the level of the uterine fundus just above the urinary bladder measuring approximately 6.4 x 4.8 x 6.5 cm. There is significant mural enhancing nodularity, particularly along the right side of the mass measuring up to 3.8 x 5.1 x 5.4 cm.          2)  second mass at the cul-de-sac posterior to the uterus measuring approximately 3.3 x 3.9 x 2.8 cm with small right paracentral area which is nonenhancing. Features are very similar to the first mass. The rectum partly wraps around this mass.         3) a left pelvic sidewall/adnexa similar route bilobed enhancing mass measuring approximately 4.8 x 2.2 x 5 cm.         4) left the lower pelvic sidewall nodule just medial to the sigmoid colon measuring approximately 2.7 x 2.1 x 1.8 cm.         5) anterior to the main mass is a small peritoneal implant just deep to the anterior abdominal wall measuring approximately 1.4 x 1.3 x 1.4 cm.      e) Moderate pelvic free fluid is visualized.     f)  a rectosigmoid wall thickening along the margins of the mass 2) CT A/P 24     a) Exophytic soft tissue lesion extending from the uterine fundus, likely a subserosal leiomyoma.     b) Left adnexal mass; likely originating from the left ovary.     c) Indeterminate 1.6 m soft tissue nodule adjacent to the cecum. As an isolated finding is nonspecific, however, cannot exclude a metastasis if the left ovarian mass is a malignant neoplasm.     d) Left hydronephrosis and hydroureter to the level of the patient's midpelvis, likely due to involvement by the above-described left adnexal mass. 3) S/P ex-lap JOSE F, BSO, omentectomy, removal of pelvic mass, flex sig by Dr. Arevalo, cysto with b/l temporary stents by urology 24     a) PELVIC WASH: Malignant Cells    b) . 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 Uterus, cervix, bilateral fallopian tubes and ovaries: Corpus: - Basal benign endometrium with cystic atrophy Cervix: - Tunnel clusters and Nabothian cysts Ovary, right: - No pathological diagnosis Fallopian tube, right: - No pathological diagnosis Ovary, left: - No pathological diagnosis Fallopian tube, left: -  Focal chronic inflammation involving the fimbriated end  3. 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 All decreased in size. No new sites of disease. * Soft tissue nodule adjacent to the cecum, measuring 1.3 cm (2:107), compared to 1.6 cm previously * left pelvic sidewall soft tissue lesion, measuring 1.5 cm (2:121), compared to 1.9 cm previously. * left pelvic sidewall lesion/enlarged lymph node, measuring approximately 1.9 x 2.4 cm (2:114), compared to 3.0 x 6.1 cm previously. 6) S/P  Diagnostic laparoscopy, open exploratory laparotomy, lysis of adhesions and assistant to low anterior resection, HIPEC 24    1. 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  2. Appendix: -   Benign appendix -   One benign lymph node  3. Rectosigmoid: -   Metastatic high-grade serous carcinoma involving the pericolic soft tissue, muscularis propria and submucosa -   Colonic mucosa is unremarkable -   Resection margins are negative -   Twenty-two benign lymph nodes  4. Portion of omentum: -   Benign fibroadipose tissue with focal inflammation and fat necrosis  5. Proximal donut: -   Benign portion of colon  6. Distal donut: -   Benign portion of colon  6) CHEMO SUMMARY- Adjuvant     a) C4 T/C 25     b) C5 T/C 25  58yo here for prechemo appointment prior to C5 (C2 adjuvant). Patient doing well. Has minimal neuropathy in hands and feet, worse in right leg, able to complete activities of daily living. Overall neuropathy is unchanged.  ObGYNHX: denies,  x 5 PMHX: Cardiac issues  - 2 stents placed , depression/anxiety  SX: cataract, cardiac stents  MED: Aspirin 81mg, atorvastatin, metoprolol, womens multivitamin ALL: NKDA SOCIAL: Never smoker  FAM: NC  Last Mammogram: up to date  Last pap/HPV: 23 neg Last Colonoscopy: never

## 2025-02-12 NOTE — PHYSICAL EXAM
[Chaperone Present] : A chaperone was present in the examining room during all aspects of the physical examination [Normal] : General appearance: No acute distress, well appearing and well nourished

## 2025-02-12 NOTE — DISCUSSION/SUMMARY
[FreeTextEntry1] : 58yo with primary peritoneal carcinoma, s/p NACT, interval debulking surgery with HIPEC, now s/p C4 adjuvant C/T. Doing well, no contraindications to chemotherapy  [] C5 T/C 2/13/25 [] Prechemo labs [] Calendar given [] Return before C6, plan for repeat imaging after C6

## 2025-02-12 NOTE — DISCUSSION/SUMMARY
[FreeTextEntry1] : 60yo with primary peritoneal carcinoma, s/p NACT, interval debulking surgery with HIPEC, now s/p C4 adjuvant C/T. Doing well, no contraindications to chemotherapy  [] C5 T/C 2/13/25 [] Prechemo labs [] Calendar given [] Return before C6, plan for repeat imaging after C6

## 2025-02-26 NOTE — HISTORY OF PRESENT ILLNESS
[FreeTextEntry1] : Problem: 1) Primary Peritoneal Carcinoma  2) Germline testing negative, tumor HRD+   Previous Therapies: 1) Pelvic MRI 24     a) Uterus: 5.3 x 2.7 x 4.3 cm EMS 0.2cm     b) RIGHT OVARY: Normal in size and appearance measuring measures 2.4 x 1.3 x 1.8 cm.      c) LEFT OVARY: A normal-appearing left ovary is not visualized. Peritoneal adnexal masses as described below.     d) PERITONEUM: There are heterogeneous T2 hyperintense/T1 mildly hypointense pelvic masses at the left adnexa as well as anterior to the uterus and rectum. These measure as follows:          1) Mass extending anteriorly from the level of the uterine fundus just above the urinary bladder measuring approximately 6.4 x 4.8 x 6.5 cm. There is significant mural enhancing nodularity, particularly along the right side of the mass measuring up to 3.8 x 5.1 x 5.4 cm.          2)  second mass at the cul-de-sac posterior to the uterus measuring approximately 3.3 x 3.9 x 2.8 cm with small right paracentral area which is nonenhancing. Features are very similar to the first mass. The rectum partly wraps around this mass.         3) a left pelvic sidewall/adnexa similar route bilobed enhancing mass measuring approximately 4.8 x 2.2 x 5 cm.         4) left the lower pelvic sidewall nodule just medial to the sigmoid colon measuring approximately 2.7 x 2.1 x 1.8 cm.         5) anterior to the main mass is a small peritoneal implant just deep to the anterior abdominal wall measuring approximately 1.4 x 1.3 x 1.4 cm.      e) Moderate pelvic free fluid is visualized.     f)  a rectosigmoid wall thickening along the margins of the mass 2) CT A/P 24     a) Exophytic soft tissue lesion extending from the uterine fundus, likely a subserosal leiomyoma.     b) Left adnexal mass; likely originating from the left ovary.     c) Indeterminate 1.6 m soft tissue nodule adjacent to the cecum. As an isolated finding is nonspecific, however, cannot exclude a metastasis if the left ovarian mass is a malignant neoplasm.     d) Left hydronephrosis and hydroureter to the level of the patient's midpelvis, likely due to involvement by the above-described left adnexal mass. 3) S/P ex-lap JOSE F, BSO, omentectomy, removal of pelvic mass, flex sig by Dr. Arevalo, cysto with b/l temporary stents by urology 24     a) PELVIC WASH: Malignant Cells    b) . 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 Uterus, cervix, bilateral fallopian tubes and ovaries: Corpus: - Basal benign endometrium with cystic atrophy Cervix: - Tunnel clusters and Nabothian cysts Ovary, right: - No pathological diagnosis Fallopian tube, right: - No pathological diagnosis Ovary, left: - No pathological diagnosis Fallopian tube, left: -  Focal chronic inflammation involving the fimbriated end  3. 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 All decreased in size. No new sites of disease. * Soft tissue nodule adjacent to the cecum, measuring 1.3 cm (2:107), compared to 1.6 cm previously * left pelvic sidewall soft tissue lesion, measuring 1.5 cm (2:121), compared to 1.9 cm previously. * left pelvic sidewall lesion/enlarged lymph node, measuring approximately 1.9 x 2.4 cm (2:114), compared to 3.0 x 6.1 cm previously. 6) S/P  Diagnostic laparoscopy, open exploratory laparotomy, lysis of adhesions and assistant to low anterior resection, HIPEC 24    1. 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  2. Appendix: -   Benign appendix -   One benign lymph node  3. Rectosigmoid: -   Metastatic high-grade serous carcinoma involving the pericolic soft tissue, muscularis propria and submucosa -   Colonic mucosa is unremarkable -   Resection margins are negative -   Twenty-two benign lymph nodes  4. Portion of omentum: -   Benign fibroadipose tissue with focal inflammation and fat necrosis  5. Proximal donut: -   Benign portion of colon  6. Distal donut: -   Benign portion of colon  6) CHEMO SUMMARY- Adjuvant     a) C4 T/C 25     b) C5 T/C 25  58yo here for prechemo appointment prior to C6 (C3 adjuvant) 3/6/24. Patient doing well.   ObGYNHX: denies,  x 5 PMHX: Cardiac issues  - 2 stents placed , depression/anxiety  SX: cataract, cardiac stents  MED: Aspirin 81mg, atorvastatin, metoprolol, womens multivitamin ALL: NKDA SOCIAL: Never smoker  FAM: NC  Last Mammogram: up to date  Last pap/HPV: 23 neg Last Colonoscopy: never

## 2025-02-26 NOTE — DISCUSSION/SUMMARY
[FreeTextEntry1] : 58yo with primary peritoneal carcinoma, s/p NACT, interval debulking surgery with HIPEC, now s/p C4 adjuvant C/T. Doing well, no contraindications to chemotherapy  [] C6 T/C 3/6/25 [] Prechemo labs [] Calendar given [] Repeat imaging after this cycle

## 2025-04-02 NOTE — DISCUSSION/SUMMARY
[FreeTextEntry1] : 60yo with primary peritoneal carcinoma, s/p NACT, interval debulking surgery with HIPEC, now s/p C6 adjuvant C/T. Post-treatment scan JERROD. Tumour is HRD+, discussed use of niraparib maintenance therapy. Patient information leaflet provided, patient will think about it. She has some dental work pending and so we advise starting niraparib after this.  [] Full labs today [] Follow up 1 month after starting niraparib [] Niraparib 200mg once daily sent to pharmacy

## 2025-04-02 NOTE — DISCUSSION/SUMMARY
[FreeTextEntry1] : 58yo with primary peritoneal carcinoma, s/p NACT, interval debulking surgery with HIPEC, now s/p C6 adjuvant C/T. Post-treatment scan JERROD. Tumour is HRD+, discussed use of niraparib maintenance therapy. Patient information leaflet provided, patient will think about it. She has some dental work pending and so we advise starting niraparib after this.  [] Full labs today [] Follow up 1 month after starting niraparib [] Niraparib 200mg once daily sent to pharmacy

## 2025-04-02 NOTE — HISTORY OF PRESENT ILLNESS
[FreeTextEntry1] : Problem: 1) Primary Peritoneal Carcinoma  2) Germline testing negative, tumor HRD+   Previous Therapies: 1) Pelvic MRI 24     a) Uterus: 5.3 x 2.7 x 4.3 cm EMS 0.2cm     b) RIGHT OVARY: Normal in size and appearance measuring measures 2.4 x 1.3 x 1.8 cm.      c) LEFT OVARY: A normal-appearing left ovary is not visualized. Peritoneal adnexal masses as described below.     d) PERITONEUM: There are heterogeneous T2 hyperintense/T1 mildly hypointense pelvic masses at the left adnexa as well as anterior to the uterus and rectum. These measure as follows:          1) Mass extending anteriorly from the level of the uterine fundus just above the urinary bladder measuring approximately 6.4 x 4.8 x 6.5 cm. There is significant mural enhancing nodularity, particularly along the right side of the mass measuring up to 3.8 x 5.1 x 5.4 cm.          2)  second mass at the cul-de-sac posterior to the uterus measuring approximately 3.3 x 3.9 x 2.8 cm with small right paracentral area which is nonenhancing. Features are very similar to the first mass. The rectum partly wraps around this mass.         3) a left pelvic sidewall/adnexa similar route bilobed enhancing mass measuring approximately 4.8 x 2.2 x 5 cm.         4) left the lower pelvic sidewall nodule just medial to the sigmoid colon measuring approximately 2.7 x 2.1 x 1.8 cm.         5) anterior to the main mass is a small peritoneal implant just deep to the anterior abdominal wall measuring approximately 1.4 x 1.3 x 1.4 cm.      e) Moderate pelvic free fluid is visualized.     f)  a rectosigmoid wall thickening along the margins of the mass 2) CT A/P 24     a) Exophytic soft tissue lesion extending from the uterine fundus, likely a subserosal leiomyoma.     b) Left adnexal mass; likely originating from the left ovary.     c) Indeterminate 1.6 m soft tissue nodule adjacent to the cecum. As an isolated finding is nonspecific, however, cannot exclude a metastasis if the left ovarian mass is a malignant neoplasm.     d) Left hydronephrosis and hydroureter to the level of the patient's midpelvis, likely due to involvement by the above-described left adnexal mass. 3) S/P ex-lap JOSE F, BSO, omentectomy, removal of pelvic mass, flex sig by Dr. Arevalo, cysto with b/l temporary stents by urology 24     a) PELVIC WASH: Malignant Cells    b) . 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 Uterus, cervix, bilateral fallopian tubes and ovaries: Corpus: - Basal benign endometrium with cystic atrophy Cervix: - Tunnel clusters and Nabothian cysts Ovary, right: - No pathological diagnosis Fallopian tube, right: - No pathological diagnosis Ovary, left: - No pathological diagnosis Fallopian tube, left: -  Focal chronic inflammation involving the fimbriated end  3. 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 All decreased in size. No new sites of disease. * Soft tissue nodule adjacent to the cecum, measuring 1.3 cm (2:107), compared to 1.6 cm previously * left pelvic sidewall soft tissue lesion, measuring 1.5 cm (2:121), compared to 1.9 cm previously. * left pelvic sidewall lesion/enlarged lymph node, measuring approximately 1.9 x 2.4 cm (2:114), compared to 3.0 x 6.1 cm previously. 6) S/P  Diagnostic laparoscopy, open exploratory laparotomy, lysis of adhesions and assistant to low anterior resection, HIPEC 24    1. 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  2. Appendix: -   Benign appendix -   One benign lymph node  3. Rectosigmoid: -   Metastatic high-grade serous carcinoma involving the pericolic soft tissue, muscularis propria and submucosa -   Colonic mucosa is unremarkable -   Resection margins are negative -   Twenty-two benign lymph nodes  4. Portion of omentum: -   Benign fibroadipose tissue with focal inflammation and fat necrosis  5. Proximal donut: -   Benign portion of colon  6. Distal donut: -   Benign portion of colon  6) CHEMO SUMMARY- Adjuvant     a) C4 T/C 25     b) C5 T/C 25     c) C6 T/C 3/6/25 7) CTAP 3/20/25 JERROD  Here to discuss CT results and survivorship planning. Patient feels well, denies pain or bleeding. Has residual minimal neuropathy.  ObGYNHX: denies,  x 5 PMHX: Cardiac issues  - 2 stents placed , depression/anxiety  SX: cataract, cardiac stents  MED: Aspirin 81mg, atorvastatin, metoprolol, womens multivitamin ALL: NKDA SOCIAL: Never smoker  FAM: NC  Last Mammogram: up to date  Last pap/HPV: 23 neg Last Colonoscopy: never

## 2025-04-02 NOTE — PHYSICAL EXAM
[Chaperone Present] : A chaperone was present in the examining room during all aspects of the physical examination [Absent] : Adnexa(ae): Absent [Normal] : Recto-Vaginal Exam: Normal [FreeTextEntry2] : Rosangeles Ildefonso [Fully active, able to carry on all pre-disease performance without restriction] : Status 0 - Fully active, able to carry on all pre-disease performance without restriction

## 2025-07-05 NOTE — DISCUSSION/SUMMARY
[FreeTextEntry1] : 58yo with primary peritoneal carcinoma, s/p NACT, interval debulking surgery with HIPEC, now s/p C6 adjuvant C/T. Post-treatment scan JERROD. Tumour is HRD+, discussed use of niraparib maintenance therapy.

## 2025-07-05 NOTE — PHYSICAL EXAM
[FreeTextEntry2] : Rosangeles Ildefonso [Absent] : Adnexa(ae): Absent [Normal] : Recto-Vaginal Exam: Normal [Fully active, able to carry on all pre-disease performance without restriction] : Status 0 - Fully active, able to carry on all pre-disease performance without restriction

## 2025-07-05 NOTE — HISTORY OF PRESENT ILLNESS
[FreeTextEntry1] : Problem: 1) Primary Peritoneal Carcinoma  2) Germline testing negative, tumor HRD+   Previous Therapies: 1) Pelvic MRI 24     a) Uterus: 5.3 x 2.7 x 4.3 cm EMS 0.2cm     b) RIGHT OVARY: Normal in size and appearance measuring measures 2.4 x 1.3 x 1.8 cm.      c) LEFT OVARY: A normal-appearing left ovary is not visualized. Peritoneal adnexal masses as described below.     d) PERITONEUM: There are heterogeneous T2 hyperintense/T1 mildly hypointense pelvic masses at the left adnexa as well as anterior to the uterus and rectum. These measure as follows:          1) Mass extending anteriorly from the level of the uterine fundus just above the urinary bladder measuring approximately 6.4 x 4.8 x 6.5 cm. There is significant mural enhancing nodularity, particularly along the right side of the mass measuring up to 3.8 x 5.1 x 5.4 cm.          2)  second mass at the cul-de-sac posterior to the uterus measuring approximately 3.3 x 3.9 x 2.8 cm with small right paracentral area which is nonenhancing. Features are very similar to the first mass. The rectum partly wraps around this mass.         3) a left pelvic sidewall/adnexa similar route bilobed enhancing mass measuring approximately 4.8 x 2.2 x 5 cm.         4) left the lower pelvic sidewall nodule just medial to the sigmoid colon measuring approximately 2.7 x 2.1 x 1.8 cm.         5) anterior to the main mass is a small peritoneal implant just deep to the anterior abdominal wall measuring approximately 1.4 x 1.3 x 1.4 cm.      e) Moderate pelvic free fluid is visualized.     f)  a rectosigmoid wall thickening along the margins of the mass 2) CT A/P 24     a) Exophytic soft tissue lesion extending from the uterine fundus, likely a subserosal leiomyoma.     b) Left adnexal mass; likely originating from the left ovary.     c) Indeterminate 1.6 m soft tissue nodule adjacent to the cecum. As an isolated finding is nonspecific, however, cannot exclude a metastasis if the left ovarian mass is a malignant neoplasm.     d) Left hydronephrosis and hydroureter to the level of the patient's midpelvis, likely due to involvement by the above-described left adnexal mass. 3) S/P ex-lap JOSE F, BSO, omentectomy, removal of pelvic mass, flex sig by Dr. Arevalo, cysto with b/l temporary stents by urology 24     a) PELVIC WASH: Malignant Cells    b) . 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 Uterus, cervix, bilateral fallopian tubes and ovaries: Corpus: - Basal benign endometrium with cystic atrophy Cervix: - Tunnel clusters and Nabothian cysts Ovary, right: - No pathological diagnosis Fallopian tube, right: - No pathological diagnosis Ovary, left: - No pathological diagnosis Fallopian tube, left: -  Focal chronic inflammation involving the fimbriated end  3. 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 All decreased in size. No new sites of disease. * Soft tissue nodule adjacent to the cecum, measuring 1.3 cm (2:107), compared to 1.6 cm previously * left pelvic sidewall soft tissue lesion, measuring 1.5 cm (2:121), compared to 1.9 cm previously. * left pelvic sidewall lesion/enlarged lymph node, measuring approximately 1.9 x 2.4 cm (2:114), compared to 3.0 x 6.1 cm previously. 6) S/P  Diagnostic laparoscopy, open exploratory laparotomy, lysis of adhesions and assistant to low anterior resection, HIPEC 24    1. 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  2. Appendix: -   Benign appendix -   One benign lymph node  3. Rectosigmoid: -   Metastatic high-grade serous carcinoma involving the pericolic soft tissue, muscularis propria and submucosa -   Colonic mucosa is unremarkable -   Resection margins are negative -   Twenty-two benign lymph nodes  4. Portion of omentum: -   Benign fibroadipose tissue with focal inflammation and fat necrosis  5. Proximal donut: -   Benign portion of colon  6. Distal donut: -   Benign portion of colon  6) CHEMO SUMMARY- Adjuvant     a) C4 T/C 25     b) C5 T/C 25     c) C6 T/C 3/6/25 7) CTAP 3/20/25 JERROD 8) Zejula 200mg daily prescribed 25  Here for surveillance visit. Patient prescribed Zejula 25 - unsure if patient is taking/started  ObGYNHX: denies,  x 5 PMHX: Cardiac issues  - 2 stents placed , depression/anxiety  SX: cataract, cardiac stents  MED: Aspirin 81mg, atorvastatin, metoprolol, womens multivitamin ALL: NKDA SOCIAL: Never smoker  FAM: NC  Last Mammogram: up to date  Last pap/HPV: 23 neg Last Colonoscopy: never

## 2025-07-07 NOTE — ASSESSMENT
[FreeTextEntry1] : Recommend improving bowel habits with increasing daily fiber intake.  Advised use of MiraLAX daily to help improve stool emptying.  Follow-up 4 months.

## 2025-07-07 NOTE — PHYSICAL EXAM
[Abdomen Masses] : No abdominal masses [Abdomen Tenderness] : ~T No ~M abdominal tenderness [No HSM] : no hepatosplenomegaly [JVD] : no jugular venous distention  [Normal Breath Sounds] : Normal breath sounds [Normal Heart Sounds] : normal heart sounds [Alert] : alert [Calm] : calm [de-identified] : ,i

## 2025-07-07 NOTE — HISTORY OF PRESENT ILLNESS
[FreeTextEntry1] : 58 y/o F presents for follow up of primary peritoneal carcinoma, s/p diagnostic laparoscopy, robotic sigmoidectomy and ileostomy creation, HIPEC 12/12/24 presents for follow up   PMH: CAD s/p PCI 2stents, anxiety/ depression PSH: cataract, cardiac stents, ex-lap JOSE F, BSO, omentectomy, removal of pelvic mass 8/22/24  FH: Denies CRC/IBD Medications: ASA, atorvastatin, metoprolol, multivitamin  Allergies: NKDA Social history:  Last Colonoscopy: Never done.  CT A/P 8/21/24: IMPRESSION: 1. Exophytic soft tissue lesion extending from the uterine fundus, likely a subserosal leiomyoma. 2. Left adnexal mass; likely originating from the left ovary. 3. Indeterminate 1.6 m soft tissue nodule adjacent to the cecum. As an isolated finding is nonspecific, however, cannot exclude a metastasis if the left ovarian mass is a malignant neoplasm. 4. Left hydronephrosis and hydroureter to the level of the patient's midpelvis, likely due to involvement by the above-described left adnexal mass.  S/P ex-lap JOSE F, BSO, omentectomy, removal of pelvic mass by Dr. Barajas, flex sig by Dr. Arevalo, cysto with b/l temporary stents by urology 8/22/24 @[facility] Pathology: 3. 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 IHC shows the tumor cells to be positive for PAX-8, WT-1, ER, p16, AE1/AE3, CAM5.2 and CK7 and show aberrant p53 overexpression (mutated phenotype); and are negative for CK 20, GATA3, p40, p63, chromogranic, synaptophysin, Napsin, and S-100 protein, findings supportive of high serous adenocarcinoma of Mullerian origin. The entire ovaries and fallopian tubes have been examined microscopically, and no carcinoma was identified in the adnexa (bilateral) nor the endometrium, suggesting the this adenocarcinoma is of primary peritoneal origin.  Also, the tumor designated as "rectal lumen tumor" shows the same morphological features as the peritoneal mass, and no bowel wall tissue is seen in the part 3 of the specimen, findings suggestive of secondary involvement of bowel.  Started on Taxol/Carbo 9/20/24, most recent treatment 11/01/2024 Pertinent labs: 9/30/24: Myriad HRD (+), GIS (+), BRCA (-)  10/8/24: CA-125: 224  10/29/24: CA-125: 115  CT A/P 11/2024: BOWEL: No bowel obstruction. PERITONEUM/RETROPERITONEUM: Decreased size of previously noted soft tissue nodule adjacent to the cecum, measuring 1.3 cm (2:107), compared to 1.6 cm previously. Decreased size of a left pelvic sidewall soft tissue lesion, measuring 1.5 cm (2:121), compared to 1.9 cm previously. VESSELS: Within normal limits. LYMPH NODES: Decreased size of previously noted left pelvic sidewall lesion/enlarged lymph node, measuring approximately 1.9 x 2.4 cm (2:114), compared to 3.0 x 6.1 cm previously. ABDOMINAL WALL: Within normal limits. BONES: Within normal limits.  IMPRESSION: 3 sites of peritoneal disease/lymphadenopathy noted. All decreased in size. No new sites of disease.  Last Seen by Dr. Alonso on 11/20/24, Plan for advanced laparoscopic robot assisted LAR and HIPEC, possible open procedure, possible ostomy formation, scheduled for 12/12/24  Initially seen 11/25/24 Patient follows up with Cardio Nj Pacheco, patient last saw him 2 weeks ago for EKG and is scheduled to follow up 12/02/24 for clearance. Denies any abdominal complaints, denies any rectal bleeding, rectal pain or any change in stools. 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 Recommend robotic sigmoid resection with HIPEC  12/12/24 s/p diagnostic laparoscopy, open exploratory laparotomy, lysis of adhesions and assistant to low anterior resection. Pathology: 1. Pararectal nodule: - Metastatic high-grade serous carcinoma involving fibroadipose tissue (see note) Note: The tumor is positive for CK7, PAX8 and WT1, and negative for CK20, and shows aberrant (overexpression) p53 expression, supporting diagnosis. 2. Appendix: - Benign appendix - One benign lymph node 3. Rectosigmoid: - Metastatic high-grade serous carcinoma involving the pericolic soft tissue, muscularis propria and submucosa - Colonic mucosa is unremarkable - Resection margins are negative - Twenty-two benign lymph nodes 4. Portion of omentum: - Benign fibroadipose tissue with focal inflammation and fat necrosis 5. Proximal donut: - Benign portion of colon 6. Distal donut: - Benign portion of colon  Office visit 1/6/25, patient recommended slow introduction of fiber to improve bowel habits. Continued management with GYN onc of primary peritoneal base carcinoma.   Last seen by Dr. Alonso 4/2/25, with primary peritoneal carcinoma, s/p NACT, interval debulking surgery with HIPEC  12/12/24, now s/p 6 cycles of adjuvant chemotherapy. Postreatment scans JERROD. Tumor is HRD+. Consider Niraparib maintenance therapy.   Patient presents for follow up  Overall doing well Reports some constipation, states she is moving bowels 3-4 times a day, but very hard pellets. states she d/c the stool softeners as it caused her abdominal pain.  Has been taking more natural supplements.  Denies any rectal bleeding. Denies any straining.

## 2025-07-14 NOTE — DISCUSSION/SUMMARY
[Reviewed Clinical Lab Test(s)] : Results of clinical tests were reviewed. [Discuss Alternatives/Risks/Benefits w/Patient] : All alternatives, risks, and benefits were discussed with the patient/family and all questions were answered.  Patient expressed good understanding and appreciates the importance of follow up as recommended. [FreeTextEntry1] : 58yo with primary peritoneal carcinoma, s/p NACT, interval debulking surgery with HIPEC, now s/p C6 adjuvant C/T. Post-treatment scan JERROD. Tumour is HRD+, discussed use of niraparib maintenance therapy. Paitent currently refusing parp Inhibotor discussed risks and toxicities of these medications and the opportunity of the patient to have an estension of DFI.  Patent is not interested at this time,a nd she isaak consider it in a few days.  will call us if she would lkie to start the medication.   [] Labs (Ca-125, CBC, BMP, lytes) [] return for surveillance in 2 months [] Patient to call if interested in PA for zejula

## 2025-07-14 NOTE — HISTORY OF PRESENT ILLNESS
[FreeTextEntry1] : Problem: 1) Primary Peritoneal Carcinoma  2) Germline testing negative, tumor HRD+   Previous Therapies: 1) Pelvic MRI 24     a) Uterus: 5.3 x 2.7 x 4.3 cm EMS 0.2cm     b) RIGHT OVARY: Normal in size and appearance measuring measures 2.4 x 1.3 x 1.8 cm.      c) LEFT OVARY: A normal-appearing left ovary is not visualized. Peritoneal adnexal masses as described below.     d) PERITONEUM: There are heterogeneous T2 hyperintense/T1 mildly hypointense pelvic masses at the left adnexa as well as anterior to the uterus and rectum. These measure as follows:          1) Mass extending anteriorly from the level of the uterine fundus just above the urinary bladder measuring approximately 6.4 x 4.8 x 6.5 cm. There is significant mural enhancing nodularity, particularly along the right side of the mass measuring up to 3.8 x 5.1 x 5.4 cm.          2)  second mass at the cul-de-sac posterior to the uterus measuring approximately 3.3 x 3.9 x 2.8 cm with small right paracentral area which is nonenhancing. Features are very similar to the first mass. The rectum partly wraps around this mass.         3) a left pelvic sidewall/adnexa similar route bilobed enhancing mass measuring approximately 4.8 x 2.2 x 5 cm.         4) left the lower pelvic sidewall nodule just medial to the sigmoid colon measuring approximately 2.7 x 2.1 x 1.8 cm.         5) anterior to the main mass is a small peritoneal implant just deep to the anterior abdominal wall measuring approximately 1.4 x 1.3 x 1.4 cm.      e) Moderate pelvic free fluid is visualized.     f)  a rectosigmoid wall thickening along the margins of the mass 2) CT A/P 24     a) Exophytic soft tissue lesion extending from the uterine fundus, likely a subserosal leiomyoma.     b) Left adnexal mass; likely originating from the left ovary.     c) Indeterminate 1.6 m soft tissue nodule adjacent to the cecum. As an isolated finding is nonspecific, however, cannot exclude a metastasis if the left ovarian mass is a malignant neoplasm.     d) Left hydronephrosis and hydroureter to the level of the patient's midpelvis, likely due to involvement by the above-described left adnexal mass. 3) S/P ex-lap JOSE F, BSO, omentectomy, removal of pelvic mass, flex sig by Dr. Arevalo, cysto with b/l temporary stents by urology 24     a) PELVIC WASH: Malignant Cells    b) . 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 Uterus, cervix, bilateral fallopian tubes and ovaries: Corpus: - Basal benign endometrium with cystic atrophy Cervix: - Tunnel clusters and Nabothian cysts Ovary, right: - No pathological diagnosis Fallopian tube, right: - No pathological diagnosis Ovary, left: - No pathological diagnosis Fallopian tube, left: -  Focal chronic inflammation involving the fimbriated end  3. 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 All decreased in size. No new sites of disease. * Soft tissue nodule adjacent to the cecum, measuring 1.3 cm (2:107), compared to 1.6 cm previously * left pelvic sidewall soft tissue lesion, measuring 1.5 cm (2:121), compared to 1.9 cm previously. * left pelvic sidewall lesion/enlarged lymph node, measuring approximately 1.9 x 2.4 cm (2:114), compared to 3.0 x 6.1 cm previously. 6) S/P  Diagnostic laparoscopy, open exploratory laparotomy, lysis of adhesions and assistant to low anterior resection, HIPEC 24    1. 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  2. Appendix: -   Benign appendix -   One benign lymph node  3. Rectosigmoid: -   Metastatic high-grade serous carcinoma involving the pericolic soft tissue, muscularis propria and submucosa -   Colonic mucosa is unremarkable -   Resection margins are negative -   Twenty-two benign lymph nodes  4. Portion of omentum: -   Benign fibroadipose tissue with focal inflammation and fat necrosis  5. Proximal donut: -   Benign portion of colon  6. Distal donut: -   Benign portion of colon  6) CHEMO SUMMARY- Adjuvant     a) C4 T/C 25     b) C5 T/C 25     c) C6 T/C 3/6/25 7) CTAP 3/20/25 JERROD 8) Zejula 200mg daily prescribed 25  Here for surveillance visit. Patient prescribed Zejula 25 - patient has not started taking Zejula. She is unsure about the side effects, would like to think about it.  ObGYNHX: denies,  x 5 PMHX: Cardiac issues  - 2 stents placed , depression/anxiety  SX: cataract, cardiac stents  MED: Aspirin 81mg, atorvastatin, metoprolol, womens multivitamin ALL: NKDA SOCIAL: Never smoker  FAM: NC  Last Mammogram: up to date  Last pap/HPV: 23 neg Last Colonoscopy: never

## 2025-07-14 NOTE — DISCUSSION/SUMMARY
[Reviewed Clinical Lab Test(s)] : Results of clinical tests were reviewed. [Discuss Alternatives/Risks/Benefits w/Patient] : All alternatives, risks, and benefits were discussed with the patient/family and all questions were answered.  Patient expressed good understanding and appreciates the importance of follow up as recommended. [FreeTextEntry1] : 60yo with primary peritoneal carcinoma, s/p NACT, interval debulking surgery with HIPEC, now s/p C6 adjuvant C/T. Post-treatment scan JERROD. Tumour is HRD+, discussed use of niraparib maintenance therapy. Paitent currently refusing parp Inhibotor discussed risks and toxicities of these medications and the opportunity of the patient to have an estension of DFI.  Patent is not interested at this time,a nd she isaak consider it in a few days.  will call us if she would lkie to start the medication.   [] Labs (Ca-125, CBC, BMP, lytes) [] return for surveillance in 2 months [] Patient to call if interested in PA for zejula

## 2025-07-14 NOTE — PHYSICAL EXAM
[Normal] : Examination of extremities for edema and/or varicosities: Normal [FreeTextEntry2] : Rosangeles Ildefonso